# Patient Record
Sex: FEMALE | Race: WHITE
[De-identification: names, ages, dates, MRNs, and addresses within clinical notes are randomized per-mention and may not be internally consistent; named-entity substitution may affect disease eponyms.]

---

## 2018-08-27 ENCOUNTER — HOSPITAL ENCOUNTER (INPATIENT)
Dept: HOSPITAL 12 - ER | Age: 83
LOS: 8 days | Discharge: HOME HEALTH | DRG: 885 | End: 2018-09-04
Payer: MEDICARE

## 2018-08-27 VITALS — BODY MASS INDEX: 19.14 KG/M2 | WEIGHT: 104 LBS | HEIGHT: 62 IN

## 2018-08-27 VITALS — SYSTOLIC BLOOD PRESSURE: 137 MMHG | DIASTOLIC BLOOD PRESSURE: 60 MMHG

## 2018-08-27 DIAGNOSIS — G40.909: ICD-10-CM

## 2018-08-27 DIAGNOSIS — F06.4: ICD-10-CM

## 2018-08-27 DIAGNOSIS — T42.72XD: ICD-10-CM

## 2018-08-27 DIAGNOSIS — E78.5: ICD-10-CM

## 2018-08-27 DIAGNOSIS — N17.0: ICD-10-CM

## 2018-08-27 DIAGNOSIS — M85.842: ICD-10-CM

## 2018-08-27 DIAGNOSIS — Z86.73: ICD-10-CM

## 2018-08-27 DIAGNOSIS — N18.9: ICD-10-CM

## 2018-08-27 DIAGNOSIS — H91.90: ICD-10-CM

## 2018-08-27 DIAGNOSIS — Z91.5: ICD-10-CM

## 2018-08-27 DIAGNOSIS — E02: ICD-10-CM

## 2018-08-27 DIAGNOSIS — I12.9: ICD-10-CM

## 2018-08-27 DIAGNOSIS — Z79.899: ICD-10-CM

## 2018-08-27 DIAGNOSIS — R26.81: ICD-10-CM

## 2018-08-27 DIAGNOSIS — F33.9: Primary | ICD-10-CM

## 2018-08-27 DIAGNOSIS — F03.90: ICD-10-CM

## 2018-08-27 PROCEDURE — A4663 DIALYSIS BLOOD PRESSURE CUFF: HCPCS

## 2018-08-27 RX ADMIN — LEVETIRACETAM SCH MG: 500 TABLET, FILM COATED ORAL at 21:03

## 2018-08-27 RX ADMIN — PRAZOSIN HYDROCHLORIDE SCH MG: 1 CAPSULE ORAL at 21:09

## 2018-08-27 RX ADMIN — ATORVASTATIN CALCIUM SCH MG: 20 TABLET, FILM COATED ORAL at 21:03

## 2018-08-28 VITALS — SYSTOLIC BLOOD PRESSURE: 145 MMHG | DIASTOLIC BLOOD PRESSURE: 53 MMHG

## 2018-08-28 VITALS — SYSTOLIC BLOOD PRESSURE: 146 MMHG | DIASTOLIC BLOOD PRESSURE: 49 MMHG

## 2018-08-28 VITALS — DIASTOLIC BLOOD PRESSURE: 60 MMHG | SYSTOLIC BLOOD PRESSURE: 164 MMHG

## 2018-08-28 VITALS — DIASTOLIC BLOOD PRESSURE: 50 MMHG | SYSTOLIC BLOOD PRESSURE: 140 MMHG

## 2018-08-28 LAB
ALP SERPL-CCNC: 111 U/L (ref 50–136)
ALT SERPL W/O P-5'-P-CCNC: 28 U/L (ref 14–59)
AST SERPL-CCNC: 21 U/L (ref 15–37)
BASOPHILS # BLD AUTO: 0.1 K/UL (ref 0–8)
BASOPHILS NFR BLD AUTO: 0.8 % (ref 0–2)
BILIRUB SERPL-MCNC: 0.3 MG/DL (ref 0.2–1)
BUN SERPL-MCNC: 36 MG/DL (ref 7–18)
CHLORIDE SERPL-SCNC: 109 MMOL/L (ref 98–107)
CHOLEST SERPL-MCNC: 155 MG/DL (ref ?–200)
CO2 SERPL-SCNC: 26 MMOL/L (ref 21–32)
CREAT SERPL-MCNC: 1.4 MG/DL (ref 0.6–1.3)
EOSINOPHIL # BLD AUTO: 0.2 K/UL (ref 0–0.7)
EOSINOPHIL NFR BLD AUTO: 2.4 % (ref 0–7)
GLUCOSE SERPL-MCNC: 103 MG/DL (ref 74–106)
HCT VFR BLD AUTO: 36 % (ref 31.2–41.9)
HDLC SERPL-MCNC: 80 MG/DL (ref 40–60)
HGB BLD-MCNC: 12.2 G/DL (ref 10.9–14.3)
LYMPHOCYTES # BLD AUTO: 1.9 K/UL (ref 20–40)
LYMPHOCYTES NFR BLD AUTO: 24.3 % (ref 20.5–51.5)
MAGNESIUM SERPL-MCNC: 2 MG/DL (ref 1.8–2.4)
MCH RBC QN AUTO: 29.7 UUG (ref 24.7–32.8)
MCHC RBC AUTO-ENTMCNC: 34 G/DL (ref 32.3–35.6)
MCV RBC AUTO: 87.8 FL (ref 75.5–95.3)
MONOCYTES # BLD AUTO: 0.8 K/UL (ref 2–10)
MONOCYTES NFR BLD AUTO: 11 % (ref 0–11)
NEUTROPHILS # BLD AUTO: 4.7 K/UL (ref 1.8–8.9)
NEUTROPHILS NFR BLD AUTO: 61.5 % (ref 38.5–71.5)
PHOSPHATE SERPL-MCNC: 3.3 MG/DL (ref 2.5–4.9)
PLATELET # BLD AUTO: 283 K/UL (ref 179–408)
POTASSIUM SERPL-SCNC: 4.1 MMOL/L (ref 3.5–5.1)
RBC # BLD AUTO: 4.1 MIL/UL (ref 3.63–4.92)
TRIGL SERPL-MCNC: 88 MG/DL (ref 30–150)
TSH SERPL DL<=0.005 MIU/L-ACNC: 5.56 MIU/ML (ref 0.36–3.74)
WBC # BLD AUTO: 7.7 K/UL (ref 3.8–11.8)
WS STN SPEC: 7.5 G/DL (ref 6.4–8.2)

## 2018-08-28 RX ADMIN — MIRTAZAPINE SCH MG: 15 TABLET, FILM COATED ORAL at 21:12

## 2018-08-28 RX ADMIN — LEVETIRACETAM SCH MG: 500 TABLET, FILM COATED ORAL at 08:22

## 2018-08-28 RX ADMIN — LEVETIRACETAM SCH MG: 500 TABLET, FILM COATED ORAL at 21:11

## 2018-08-28 RX ADMIN — LISINOPRIL SCH MG: 20 TABLET ORAL at 08:22

## 2018-08-28 RX ADMIN — ATORVASTATIN CALCIUM SCH MG: 20 TABLET, FILM COATED ORAL at 21:11

## 2018-08-28 RX ADMIN — PRAZOSIN HYDROCHLORIDE SCH MG: 1 CAPSULE ORAL at 21:12

## 2018-08-28 RX ADMIN — AMLODIPINE BESYLATE SCH MG: 10 TABLET ORAL at 08:22

## 2018-08-29 VITALS — DIASTOLIC BLOOD PRESSURE: 44 MMHG | SYSTOLIC BLOOD PRESSURE: 129 MMHG

## 2018-08-29 VITALS — SYSTOLIC BLOOD PRESSURE: 155 MMHG | DIASTOLIC BLOOD PRESSURE: 50 MMHG

## 2018-08-29 VITALS — SYSTOLIC BLOOD PRESSURE: 168 MMHG | DIASTOLIC BLOOD PRESSURE: 80 MMHG

## 2018-08-29 VITALS — DIASTOLIC BLOOD PRESSURE: 54 MMHG | SYSTOLIC BLOOD PRESSURE: 146 MMHG

## 2018-08-29 LAB
BUN SERPL-MCNC: 39 MG/DL (ref 7–18)
CHLORIDE SERPL-SCNC: 108 MMOL/L (ref 98–107)
CO2 SERPL-SCNC: 27 MMOL/L (ref 21–32)
CREAT SERPL-MCNC: 1.3 MG/DL (ref 0.6–1.3)
GLUCOSE SERPL-MCNC: 86 MG/DL (ref 74–106)
POTASSIUM SERPL-SCNC: 4.2 MMOL/L (ref 3.5–5.1)

## 2018-08-29 RX ADMIN — PRAZOSIN HYDROCHLORIDE SCH MG: 1 CAPSULE ORAL at 20:25

## 2018-08-29 RX ADMIN — ATORVASTATIN CALCIUM SCH MG: 20 TABLET, FILM COATED ORAL at 20:23

## 2018-08-29 RX ADMIN — LISINOPRIL SCH MG: 20 TABLET ORAL at 08:03

## 2018-08-29 RX ADMIN — LEVETIRACETAM SCH MG: 500 TABLET, FILM COATED ORAL at 08:02

## 2018-08-29 RX ADMIN — LEVETIRACETAM SCH MG: 500 TABLET, FILM COATED ORAL at 20:23

## 2018-08-29 RX ADMIN — AMLODIPINE BESYLATE SCH MG: 10 TABLET ORAL at 08:03

## 2018-08-29 RX ADMIN — MIRTAZAPINE SCH MG: 15 TABLET, FILM COATED ORAL at 20:23

## 2018-08-30 VITALS — DIASTOLIC BLOOD PRESSURE: 52 MMHG | SYSTOLIC BLOOD PRESSURE: 158 MMHG

## 2018-08-30 VITALS — SYSTOLIC BLOOD PRESSURE: 144 MMHG | DIASTOLIC BLOOD PRESSURE: 54 MMHG

## 2018-08-30 VITALS — SYSTOLIC BLOOD PRESSURE: 128 MMHG | DIASTOLIC BLOOD PRESSURE: 41 MMHG

## 2018-08-30 RX ADMIN — AMLODIPINE BESYLATE SCH MG: 10 TABLET ORAL at 08:57

## 2018-08-30 RX ADMIN — PRAZOSIN HYDROCHLORIDE SCH MG: 1 CAPSULE ORAL at 21:02

## 2018-08-30 RX ADMIN — LISINOPRIL SCH MG: 20 TABLET ORAL at 08:57

## 2018-08-30 RX ADMIN — MIRTAZAPINE SCH MG: 15 TABLET, FILM COATED ORAL at 21:02

## 2018-08-30 RX ADMIN — LEVETIRACETAM SCH MG: 500 TABLET, FILM COATED ORAL at 08:56

## 2018-08-30 RX ADMIN — LEVETIRACETAM SCH MG: 500 TABLET, FILM COATED ORAL at 21:02

## 2018-08-30 RX ADMIN — ATORVASTATIN CALCIUM SCH MG: 20 TABLET, FILM COATED ORAL at 21:02

## 2018-08-31 VITALS — SYSTOLIC BLOOD PRESSURE: 134 MMHG | DIASTOLIC BLOOD PRESSURE: 60 MMHG

## 2018-08-31 VITALS — SYSTOLIC BLOOD PRESSURE: 112 MMHG | DIASTOLIC BLOOD PRESSURE: 40 MMHG

## 2018-08-31 VITALS — SYSTOLIC BLOOD PRESSURE: 153 MMHG | DIASTOLIC BLOOD PRESSURE: 57 MMHG

## 2018-08-31 RX ADMIN — MIRTAZAPINE SCH MG: 15 TABLET, FILM COATED ORAL at 20:26

## 2018-08-31 RX ADMIN — LEVETIRACETAM SCH MG: 500 TABLET, FILM COATED ORAL at 20:26

## 2018-08-31 RX ADMIN — LISINOPRIL SCH MG: 20 TABLET ORAL at 08:22

## 2018-08-31 RX ADMIN — PRAZOSIN HYDROCHLORIDE SCH MG: 1 CAPSULE ORAL at 20:27

## 2018-08-31 RX ADMIN — AMLODIPINE BESYLATE SCH MG: 10 TABLET ORAL at 08:22

## 2018-08-31 RX ADMIN — LEVETIRACETAM SCH MG: 500 TABLET, FILM COATED ORAL at 08:21

## 2018-08-31 RX ADMIN — ATORVASTATIN CALCIUM SCH MG: 20 TABLET, FILM COATED ORAL at 20:26

## 2018-09-01 VITALS — SYSTOLIC BLOOD PRESSURE: 122 MMHG | DIASTOLIC BLOOD PRESSURE: 40 MMHG

## 2018-09-01 VITALS — DIASTOLIC BLOOD PRESSURE: 44 MMHG | SYSTOLIC BLOOD PRESSURE: 119 MMHG

## 2018-09-01 VITALS — DIASTOLIC BLOOD PRESSURE: 63 MMHG | SYSTOLIC BLOOD PRESSURE: 139 MMHG

## 2018-09-01 RX ADMIN — LEVETIRACETAM SCH MG: 500 TABLET, FILM COATED ORAL at 20:52

## 2018-09-01 RX ADMIN — PRAZOSIN HYDROCHLORIDE SCH MG: 1 CAPSULE ORAL at 20:54

## 2018-09-01 RX ADMIN — ATORVASTATIN CALCIUM SCH MG: 20 TABLET, FILM COATED ORAL at 20:53

## 2018-09-01 RX ADMIN — LEVETIRACETAM SCH MG: 500 TABLET, FILM COATED ORAL at 08:01

## 2018-09-01 RX ADMIN — LISINOPRIL SCH MG: 20 TABLET ORAL at 08:03

## 2018-09-01 RX ADMIN — AMLODIPINE BESYLATE SCH MG: 10 TABLET ORAL at 08:02

## 2018-09-01 RX ADMIN — MIRTAZAPINE SCH MG: 15 TABLET, FILM COATED ORAL at 20:52

## 2018-09-02 VITALS — SYSTOLIC BLOOD PRESSURE: 125 MMHG | DIASTOLIC BLOOD PRESSURE: 55 MMHG

## 2018-09-02 VITALS — SYSTOLIC BLOOD PRESSURE: 147 MMHG | DIASTOLIC BLOOD PRESSURE: 55 MMHG

## 2018-09-02 VITALS — DIASTOLIC BLOOD PRESSURE: 50 MMHG | SYSTOLIC BLOOD PRESSURE: 119 MMHG

## 2018-09-02 RX ADMIN — ATORVASTATIN CALCIUM SCH MG: 20 TABLET, FILM COATED ORAL at 20:31

## 2018-09-02 RX ADMIN — LEVETIRACETAM SCH MG: 500 TABLET, FILM COATED ORAL at 09:20

## 2018-09-02 RX ADMIN — AMLODIPINE BESYLATE SCH MG: 10 TABLET ORAL at 09:00

## 2018-09-02 RX ADMIN — LEVETIRACETAM SCH MG: 500 TABLET, FILM COATED ORAL at 20:32

## 2018-09-02 RX ADMIN — MIRTAZAPINE SCH MG: 15 TABLET, FILM COATED ORAL at 20:32

## 2018-09-02 RX ADMIN — PRAZOSIN HYDROCHLORIDE SCH MG: 1 CAPSULE ORAL at 20:32

## 2018-09-02 RX ADMIN — LISINOPRIL SCH MG: 20 TABLET ORAL at 09:00

## 2018-09-03 VITALS — SYSTOLIC BLOOD PRESSURE: 136 MMHG | DIASTOLIC BLOOD PRESSURE: 47 MMHG

## 2018-09-03 VITALS — DIASTOLIC BLOOD PRESSURE: 51 MMHG | SYSTOLIC BLOOD PRESSURE: 138 MMHG

## 2018-09-03 VITALS — DIASTOLIC BLOOD PRESSURE: 51 MMHG | SYSTOLIC BLOOD PRESSURE: 118 MMHG

## 2018-09-03 RX ADMIN — LISINOPRIL SCH MG: 20 TABLET ORAL at 09:29

## 2018-09-03 RX ADMIN — LEVETIRACETAM SCH MG: 500 TABLET, FILM COATED ORAL at 09:28

## 2018-09-03 RX ADMIN — AMLODIPINE BESYLATE SCH MG: 10 TABLET ORAL at 09:30

## 2018-09-03 RX ADMIN — ATORVASTATIN CALCIUM SCH MG: 20 TABLET, FILM COATED ORAL at 20:07

## 2018-09-03 RX ADMIN — MIRTAZAPINE SCH MG: 15 TABLET, FILM COATED ORAL at 20:07

## 2018-09-03 RX ADMIN — PRAZOSIN HYDROCHLORIDE SCH MG: 1 CAPSULE ORAL at 20:08

## 2018-09-03 RX ADMIN — LEVETIRACETAM SCH MG: 500 TABLET, FILM COATED ORAL at 20:07

## 2018-09-04 VITALS — DIASTOLIC BLOOD PRESSURE: 56 MMHG | SYSTOLIC BLOOD PRESSURE: 132 MMHG

## 2018-09-04 VITALS — SYSTOLIC BLOOD PRESSURE: 134 MMHG | DIASTOLIC BLOOD PRESSURE: 56 MMHG

## 2018-09-04 RX ADMIN — LISINOPRIL SCH MG: 20 TABLET ORAL at 09:14

## 2018-09-04 RX ADMIN — AMLODIPINE BESYLATE SCH MG: 10 TABLET ORAL at 09:14

## 2018-09-04 RX ADMIN — LEVETIRACETAM SCH MG: 500 TABLET, FILM COATED ORAL at 09:14

## 2021-11-05 NOTE — NUR
-------------------------------------------------------------------------------

          *** Note undone in ED - 08/27/18 at 1632 by ADAN ***          

-------------------------------------------------------------------------------

Patient discharged to home in stable conditon.  Written and verbal after care 
instructions given. 

Patient verbalizes understanding of instructions.PT WALKS IN STEADY GAIT.
Discharge Note:  received confirmation from [facility] nursing station that Pt will be 
discharged today, September 4, 2018. Pt will be going to [facility] ([place] [address], [phone]), an assisted living. Pt's family has arranged 
for her to have a room. Pt will receive transportation from her daughter, Sisi 
([phone]) at approximately 11:00am this morning. Pt will receive follow-up, outpatient 
medical care from Dr. Trent who is located in [place], [place] ([phone]). Pt has an 
appointment scheduled for September 10th. Pt is in the process of arranging outpatient 
psychiatry and has an evaluation appointment set up for later this afternoon at [facility] located in [place], [place].  provided Pt with mental 
health resources including [facility] ([address] [place] 
[address], [phone]), [facility] Crisis Line ([phone]), and National Suicide 
Prevention Life Line (1-677.415.5040).
Discharge Note:  received confirmation from attending Psychiatrist that Pt is 
ready to be d/c and that pt family would like to have Pt come home today. Pt will be 
returning home ([address] [place] [id number]), where Pt lives with his sister 
in law. Pt family will provide transportation. Per Pt daughter, Pt is followed outpatient by 
Dr. Vargas (PCP). Per family, they wish to see Pt's UTI clear up and all the 
"medications to leave his system" before seeking outpatient psychiatric help as they feel 
UTI could be cause of Pts confusion.  did provide family with a list of 
medicare accepting psychiatrists should the family need assistance. Home Health was ordered 
for Pt for medication management and physical therapy. Fax was sent to [facility] (F# 
[phone], ph# [phone]).  awaiting confirmation. 

-------------------------------------------------------------------------------

Addendum: 09/04/18 at 1317 by ANDREW TURNER

-------------------------------------------------------------------------------

Correction-wrong Pt. Disregard note.
Firearms Report:   completed and submitted [employer] Firearms Report on 9/5/18 for 
5150 DTS certification.
GPS/RN- Patient remains isolative and withdrawn, patient down plays her depression, 
superficial. compliant with meds. Encouraged to come out from room for meals and activities. 
patient focused on discharge, that she has a place to go.  patient denies any suicidal 
ideation. explained hold and hearing process.
GPS: Nursing Notes: Discharge Notes:

Patient is awake and responding to her name, cooperative with nursing care, compliant with 
her medications, following staff directions, A/Ox4, denies any SI/HI, denies any AH/VH, 
denies any pain or discomfort, denies any SOB, [facility] ([place] 
[place] [address] [phone], picked up by her daughter - Sisi 
[phone], transported via private vehicle by her daughter, took all her belongings with 
her. Patient will receive follow-up, outpatient medical care from Dr. Trent who is located 
in [place], [place] ([phone]). Pt has an appointment scheduled for September 10th. Pt is in 
the process of arranging outpatient psychiatry and has an evaluation appointment set up for 
later this afternoon at [facility] located in [place], [place]. Social 
worker provided Pt with mental health resources including [facility] 
([address] [place] [address], [phone]), [facility] Crisis Line 
([phone]), and National Suicide Prevention Life Line (1-220.836.7373).
Initial Discharge Plan: Pt will be living at [facility] ([place] [address], [phone]), an assisted living. Per daughter, Tita, Pt was in the 
process of moving to her new place when current hospitalization occurred. Pt's room is 
move-in ready. Per Pt, she is excited to move here. Pt also explains that she will be 
starting 1:1 therapy. Per Tita, they are awaiting a psychotherapist referral from Pt's 
PCP, Dr. Trent. Once received, Tita will call with psychotherapist phone name and 
follow-up appointment for Pt. Pt's daughter in law, Renee Black, will be providing 
transportation for Pt once d/c. Tita will be following up with  with Renee's 
contact information. Sw will speak with MD about tentative d/c and will continue to follow 
case and work on a safe and proper discharge.
MET W/ PT IN THE DAYROOM. WRITER INTRODUCE SELF AND THE PURPOSE ON VISIT. PT A/O X 3. 
COMPLIANT WITH MEDICATION REGIMEN. WRITER OBSERVED LEFT HAND REDNESS AND BRUSING. PT C/O 
LEFT HAND ITCHING BUT DENIES BUMPING IT, HITTING IT OR PAIN AT THIS TIME. MEDICAL DOCTOR 
CALLED AND AWAITING CALL BACK. SHE'S ABLE TO MAKE HER NEEDS KNOW. INTERACT W/ SELECTIVE 
PEER.  DENIES SI/HI. PT ENCOURAGED TO VERBALIZED FEELINGS.  WILL CONTINUE TO MONITOR NEEDS 
AND FOR SAFETY.
NO SIGNS OF RESPIRATORY DISTRESS, DENIES SI INSIDE AND OUTSIDE OF THE HOSPITAL.  CONTINUE TO 
MONITOR PTS.
No complaints of pain or distress at this time. Pt encouraged to turn and reposition in bed 
with assistance from staff. Continues to require 1:1 supervision. Bed continues to remain in 
a low and locked position. Call light in reach. Will continue to monitor.
Nursing Note:



No complaints of pain or distress at this time. Pt encouraged to turn and reposition in bed 
with assistance from staff. Continues to require 1:1 supervision. Bed continues to remain in 
a low and locked position. Call light in reach. Will continue to monitor.
Nursing Note:



Pt resting in bed. Respirations even and unlabored. Patient seen by Dr. Pascal. New order 
for [employer]. Pt in room with daughter and son in law. Pt also seen by doctor to discuss 
plan of care. Pt verbalized understanding of plan. No complaints of pain at this time. Bed 
in low and locked position. Call light in reach. Will continue plan of care. 

-------------------------------------------------------------------------------

Addendum: 08/29/18 at 0356 by SALMA ALVARADO RN

-------------------------------------------------------------------------------

documented in error
Nursing Note: 



Patient resting comfortably in bed. Respirations even and unlabored on room air. No 
complaints of pain or distress at this time. Patient seen by Psych MD tonight. No new orders 
noted. Pt on 1:1 hold x 72hrs until 8/29/18. Patient awake alert orient. No hallucinations, 
no verbalizations of suicidal ideation. Pt cooperative for now. All due medications 
administered with no adverse reactions noted. Pt requested pain medication. All needs 
anticipated and met. Call light in reach. Continue plan of care.
Nursing Note: 



Patient resting comfortably in bed. Respirations even and unlabored on room air. No 
complaints of pain or distress at this time. Pt on 1:1 hold x 14 day hold. Patient awake 
alert orient. No hallucinations, no verbalizations of suicidal ideation. Pt cooperative for 
now. All due medications administered with no adverse reactions noted. Pt requested pain 
medication. All needs anticipated and met. Call light in reach. Continue plan of care.
Nursing Note: 



Pt resting comfortably in bed. Medication for sleep appears to be effective. Patient 
sleeping soundly. Vital signs within normal limits. Pt noted with sitter at bedside at all 
times. No complaints of pain or discomfort at this time. Bed in a low and locked position. 
Call light in reach. Will continue to monitor.
Nursing Note: 



Pt resting comfortably in bed. Medication for sleep appears to be effective. Patient 
sleeping soundly. Vital signs within normal limits. Pt noted with sitter at bedside at all 
times. No complaints of pain or discomfort at this time. Bed in a low and locked position. 
Call light in reach. Will continue to monitor.
PT ARRIVED ON THE UNIT, CALM, COOPERATIVE,  DENIES SI, NO SIGNS OF DISTRESS. CONTINUE TO 
MONITOR PT. 1:1 SITTER FOR SAFETY.
PT RECEIVED TO CARE. PT WAS ORIENTED TO THE UNIT, PT WENT TO HER ROOM, RESTING IN BED, NO 
DISTRESS NOTED.
PT TRANSFERED TO MHU IN STABLE CONDITION.
Pt resting comfortably in bed. Vital signs within normal limits.  sitter at bedside at all 
times. No complaints of pain or discomfort at this time. Bed in a low and locked position. 
Will continue to monitor.
RECEIVED PATIENT IN HER ROOM IN BED READING A BOOK. SHE IS NOTED A/O X 3. SHE IS ABLE TO 
AMBULATE WITH STEADY GAIT AND ABLE TO MAKE HER NEEDS KNOW. SHE CONTINUE WITHDRAWN, LOW MOOD, 
BLUNTED AFFECT. SHE STATED THAT SHE DID NOT WANT TO TALK ABOUT THE REASON FOR HER ADMISSION 
TO MHU BECAUSE IT WAS A "BIG MISTAKE". SHE DENIES SI. AND SHE IS ABLE TO CFS. PT WAS 
ENCOURAGED TO VERBALIZED FEELINGS. CONTINUE V/S STABLE AT THIS TIME. CONTINUE COMPLIANT WITH 
MEDICATION REGIMENT. SAFETY WAS EMPHASIS. BED AT LOWEST POSITION WITH WHEELS LOCKED AND WILL 
CONTINUE FREQUENTS ROUNDS.
TRANSFER THE PT TO U VIA WHEEL CHAIR IN STABLE CONDITION
in bed awake seen by dr [email or web address]
[id number]